# Patient Record
Sex: MALE | Race: WHITE
[De-identification: names, ages, dates, MRNs, and addresses within clinical notes are randomized per-mention and may not be internally consistent; named-entity substitution may affect disease eponyms.]

---

## 2018-05-25 ENCOUNTER — HOSPITAL ENCOUNTER (EMERGENCY)
Dept: HOSPITAL 54 - ER | Age: 33
Discharge: HOME | End: 2018-05-25
Payer: MEDICAID

## 2018-05-25 VITALS — DIASTOLIC BLOOD PRESSURE: 69 MMHG | SYSTOLIC BLOOD PRESSURE: 135 MMHG

## 2018-05-25 VITALS — WEIGHT: 145 LBS | HEIGHT: 64 IN | BODY MASS INDEX: 24.75 KG/M2

## 2018-05-25 DIAGNOSIS — G62.9: ICD-10-CM

## 2018-05-25 DIAGNOSIS — Z76.0: Primary | ICD-10-CM

## 2018-05-25 PROCEDURE — 99283 EMERGENCY DEPT VISIT LOW MDM: CPT

## 2018-05-25 PROCEDURE — A4606 OXYGEN PROBE USED W OXIMETER: HCPCS

## 2018-05-25 PROCEDURE — Z7610: HCPCS

## 2018-06-02 ENCOUNTER — HOSPITAL ENCOUNTER (EMERGENCY)
Dept: HOSPITAL 12 - ER | Age: 33
Discharge: HOME | End: 2018-06-02
Payer: MEDICAID

## 2018-06-02 VITALS — WEIGHT: 145 LBS | BODY MASS INDEX: 24.75 KG/M2 | HEIGHT: 64 IN

## 2018-06-02 VITALS — DIASTOLIC BLOOD PRESSURE: 61 MMHG | SYSTOLIC BLOOD PRESSURE: 114 MMHG

## 2018-06-02 DIAGNOSIS — Z79.2: ICD-10-CM

## 2018-06-02 DIAGNOSIS — Z91.018: ICD-10-CM

## 2018-06-02 DIAGNOSIS — Z79.899: ICD-10-CM

## 2018-06-02 DIAGNOSIS — Z76.0: Primary | ICD-10-CM

## 2018-06-02 DIAGNOSIS — Z79.1: ICD-10-CM

## 2018-06-02 PROCEDURE — A4663 DIALYSIS BLOOD PRESSURE CUFF: HCPCS

## 2018-06-23 ENCOUNTER — HOSPITAL ENCOUNTER (EMERGENCY)
Dept: HOSPITAL 12 - ER | Age: 33
Discharge: HOME | End: 2018-06-23
Payer: MEDICAID

## 2018-06-23 VITALS — HEIGHT: 64 IN | BODY MASS INDEX: 24.75 KG/M2 | WEIGHT: 145 LBS

## 2018-06-23 VITALS — SYSTOLIC BLOOD PRESSURE: 118 MMHG | DIASTOLIC BLOOD PRESSURE: 74 MMHG

## 2018-06-23 DIAGNOSIS — Z76.0: Primary | ICD-10-CM

## 2018-06-23 DIAGNOSIS — Z79.899: ICD-10-CM

## 2018-06-23 DIAGNOSIS — Z79.1: ICD-10-CM

## 2018-06-23 DIAGNOSIS — Z91.018: ICD-10-CM

## 2018-06-23 DIAGNOSIS — Z79.2: ICD-10-CM

## 2018-06-23 PROCEDURE — 99283 EMERGENCY DEPT VISIT LOW MDM: CPT

## 2018-06-23 PROCEDURE — A4663 DIALYSIS BLOOD PRESSURE CUFF: HCPCS

## 2018-07-09 ENCOUNTER — HOSPITAL ENCOUNTER (EMERGENCY)
Dept: HOSPITAL 12 - ER | Age: 33
Discharge: HOME | End: 2018-07-09
Payer: MEDICAID

## 2018-07-09 VITALS — WEIGHT: 145 LBS | BODY MASS INDEX: 24.75 KG/M2 | HEIGHT: 64 IN

## 2018-07-09 VITALS — DIASTOLIC BLOOD PRESSURE: 78 MMHG | SYSTOLIC BLOOD PRESSURE: 121 MMHG

## 2018-07-09 DIAGNOSIS — Z79.899: ICD-10-CM

## 2018-07-09 DIAGNOSIS — Z91.018: ICD-10-CM

## 2018-07-09 DIAGNOSIS — Z76.0: Primary | ICD-10-CM

## 2018-07-09 PROCEDURE — A4663 DIALYSIS BLOOD PRESSURE CUFF: HCPCS

## 2018-07-09 NOTE — NUR
Patient discharged to home in stable conditon.  Written and verbal after care 
instructions given. 

Patient verbalizes understanding of instructions. Ambulated from ER with stable 
gait. All belongings with patient.

## 2018-08-02 ENCOUNTER — HOSPITAL ENCOUNTER (EMERGENCY)
Dept: HOSPITAL 54 - ER | Age: 33
Discharge: HOME | End: 2018-08-02
Payer: MEDICAID

## 2018-08-02 VITALS — BODY MASS INDEX: 24.75 KG/M2 | WEIGHT: 145 LBS | HEIGHT: 64 IN

## 2018-08-02 VITALS — DIASTOLIC BLOOD PRESSURE: 84 MMHG | SYSTOLIC BLOOD PRESSURE: 140 MMHG

## 2018-08-02 DIAGNOSIS — G62.9: ICD-10-CM

## 2018-08-02 DIAGNOSIS — K21.9: Primary | ICD-10-CM

## 2018-08-02 PROCEDURE — 99283 EMERGENCY DEPT VISIT LOW MDM: CPT

## 2018-08-02 PROCEDURE — A4606 OXYGEN PROBE USED W OXIMETER: HCPCS

## 2018-08-02 PROCEDURE — Z7610: HCPCS

## 2018-08-15 ENCOUNTER — HOSPITAL ENCOUNTER (EMERGENCY)
Dept: HOSPITAL 12 - ER | Age: 33
Discharge: HOME | End: 2018-08-15
Payer: MEDICAID

## 2018-08-15 VITALS — HEIGHT: 64 IN | BODY MASS INDEX: 24.75 KG/M2 | WEIGHT: 145 LBS

## 2018-08-15 DIAGNOSIS — Z91.018: ICD-10-CM

## 2018-08-15 DIAGNOSIS — G89.29: Primary | ICD-10-CM

## 2018-08-15 DIAGNOSIS — M79.662: ICD-10-CM

## 2018-08-15 DIAGNOSIS — F11.10: ICD-10-CM

## 2018-08-15 DIAGNOSIS — Z76.0: ICD-10-CM

## 2018-08-15 PROCEDURE — A4663 DIALYSIS BLOOD PRESSURE CUFF: HCPCS

## 2018-08-15 PROCEDURE — 99283 EMERGENCY DEPT VISIT LOW MDM: CPT

## 2018-08-15 NOTE — NUR
Patient discharged to home in stable conditon & brisk steady gait.  Written and 
verbal after care instructions given.Patient verbalizes understanding of 
instructions.

## 2019-01-30 ENCOUNTER — HOSPITAL ENCOUNTER (EMERGENCY)
Dept: HOSPITAL 12 - ER | Age: 34
Discharge: HOME | End: 2019-01-30
Payer: SELF-PAY

## 2019-01-30 VITALS — WEIGHT: 145 LBS | HEIGHT: 64 IN | BODY MASS INDEX: 24.75 KG/M2

## 2019-01-30 DIAGNOSIS — Z91.018: ICD-10-CM

## 2019-01-30 DIAGNOSIS — Z79.899: ICD-10-CM

## 2019-01-30 DIAGNOSIS — J32.9: Primary | ICD-10-CM

## 2019-01-30 DIAGNOSIS — F17.200: ICD-10-CM

## 2019-01-30 PROCEDURE — A4663 DIALYSIS BLOOD PRESSURE CUFF: HCPCS
